# Patient Record
Sex: FEMALE | Race: OTHER | HISPANIC OR LATINO | URBAN - METROPOLITAN AREA
[De-identification: names, ages, dates, MRNs, and addresses within clinical notes are randomized per-mention and may not be internally consistent; named-entity substitution may affect disease eponyms.]

---

## 2018-03-26 ENCOUNTER — OFFICE (OUTPATIENT)
Dept: URBAN - METROPOLITAN AREA CLINIC 78 | Facility: CLINIC | Age: 43
End: 2018-03-26

## 2018-03-26 VITALS
WEIGHT: 224 LBS | TEMPERATURE: 97.3 F | HEART RATE: 84 BPM | DIASTOLIC BLOOD PRESSURE: 101 MMHG | HEIGHT: 56 IN | SYSTOLIC BLOOD PRESSURE: 133 MMHG

## 2018-03-26 DIAGNOSIS — K21.9 GASTRO-ESOPHAGEAL REFLUX DISEASE WITHOUT ESOPHAGITIS: ICD-10-CM

## 2018-03-26 DIAGNOSIS — E66.01 MORBID (SEVERE) OBESITY DUE TO EXCESS CALORIES: ICD-10-CM

## 2018-03-26 DIAGNOSIS — M54.5 LOW BACK PAIN: ICD-10-CM

## 2018-03-26 PROCEDURE — 99244 OFF/OP CNSLTJ NEW/EST MOD 40: CPT

## 2018-04-30 ENCOUNTER — ON CAMPUS - OUTPATIENT (OUTPATIENT)
Dept: URBAN - METROPOLITAN AREA HOSPITAL 63 | Facility: HOSPITAL | Age: 43
End: 2018-04-30

## 2018-04-30 DIAGNOSIS — K21.9 GASTRO-ESOPHAGEAL REFLUX DISEASE WITHOUT ESOPHAGITIS: ICD-10-CM

## 2018-04-30 DIAGNOSIS — R13.10 DYSPHAGIA, UNSPECIFIED: ICD-10-CM

## 2018-04-30 PROCEDURE — 43450 DILATE ESOPHAGUS 1/MULT PASS: CPT

## 2018-04-30 PROCEDURE — 43239 EGD BIOPSY SINGLE/MULTIPLE: CPT

## 2018-05-31 ENCOUNTER — OFFICE (OUTPATIENT)
Dept: URBAN - METROPOLITAN AREA CLINIC 78 | Facility: CLINIC | Age: 43
End: 2018-05-31

## 2018-05-31 VITALS
WEIGHT: 225 LBS | HEART RATE: 90 BPM | HEIGHT: 56 IN | SYSTOLIC BLOOD PRESSURE: 106 MMHG | TEMPERATURE: 98.5 F | DIASTOLIC BLOOD PRESSURE: 73 MMHG

## 2018-05-31 DIAGNOSIS — E66.01 MORBID (SEVERE) OBESITY DUE TO EXCESS CALORIES: ICD-10-CM

## 2018-05-31 DIAGNOSIS — K21.9 GASTRO-ESOPHAGEAL REFLUX DISEASE WITHOUT ESOPHAGITIS: ICD-10-CM

## 2018-05-31 DIAGNOSIS — M54.5 LOW BACK PAIN: ICD-10-CM

## 2018-05-31 PROCEDURE — 99214 OFFICE O/P EST MOD 30 MIN: CPT

## 2018-05-31 RX ORDER — ESOMEPRAZOLE MAGNESIUM 40 MG/1
CAPSULE, DELAYED RELEASE ORAL
Qty: 30 | Refills: 1 | Status: COMPLETED
End: 2022-07-07

## 2018-05-31 NOTE — SERVICEHPINOTES
MARKUS BANSAL   is a   43  female who presents for f/u after EGD and chronic GERD. She had EGD 03/26/18 showing chronic gastritis and negative h pylori. She has been taking Esomeprazole 40 mg as needed x 2 months, which she say has improved her reflux and decreased sensation of "food stuck" over mid to lower sternum. She notes reflux is triggered by spicy foods, tomatoes, and sodas that she is trying to eliminate. She expresses interest in losing weight and plans to do a "natural detox" on her own. She has frustration about inability to loss weight and encouraged her to seek weight loss center help to guide her on this journey. She has h/o cholecystectomy 6 years ago. Denies nausea, vomiting, epigastric pain, melena, change in bowel habits, weight loss. FONT style="BACKGROUND-COLOR: #ffffcc" visited="true"BR/FONT

## 2019-07-12 ENCOUNTER — OFFICE (OUTPATIENT)
Dept: URBAN - METROPOLITAN AREA CLINIC 78 | Facility: CLINIC | Age: 44
End: 2019-07-12

## 2019-07-12 VITALS
DIASTOLIC BLOOD PRESSURE: 78 MMHG | HEIGHT: 56 IN | TEMPERATURE: 97.1 F | WEIGHT: 223 LBS | SYSTOLIC BLOOD PRESSURE: 112 MMHG | HEART RATE: 62 BPM

## 2019-07-12 DIAGNOSIS — K21.9 GASTRO-ESOPHAGEAL REFLUX DISEASE WITHOUT ESOPHAGITIS: ICD-10-CM

## 2019-07-12 DIAGNOSIS — E66.01 MORBID (SEVERE) OBESITY DUE TO EXCESS CALORIES: ICD-10-CM

## 2019-07-12 PROCEDURE — 99214 OFFICE O/P EST MOD 30 MIN: CPT

## 2019-07-12 RX ORDER — OMEPRAZOLE 40 MG/1
CAPSULE, DELAYED RELEASE ORAL
Qty: 90 | Refills: 3 | Status: COMPLETED
Start: 2019-07-12 | End: 2022-07-07

## 2019-07-12 NOTE — SERVICEHPINOTES
MARKUS BANSAL   is a   44  female who is here for annual f/u GERD visit. She reports taking Omeprazole 40 mg qd since 03/2018 that provides well control as evident by rare breakthrough symptoms. She mentions stopping the Omeprazole 40 mg qd x 2 months ago due to concerns about potential side effects and noting return of reflux. Discussed risks/benefits of PPI use, advised trial of H2BR if she want's to avoid PPIs, and reviewed records from prior EGD with patient. Her EGD in 03/26/18 showed chronic gastritis and bx negative h pylori. She states rare breakthrough reflux is triggered by spicy foods, tomatoes, and sodas that she tries to avoid for the most part. She expresses interest in losing weight and plans to revisit weight loss center to possibly restart rx Phentermine. She has frustration about inability to loss weight and encouraged her to seek weight loss center help to guide her on this journey. She has h/o cholecystectomy 6 years ago. Denies n/v, dysphagia, epigastric pain, melena, change in bowel habits, weight loss. No other complaints. BR

## 2022-07-07 ENCOUNTER — OFFICE (OUTPATIENT)
Dept: URBAN - METROPOLITAN AREA CLINIC 79 | Facility: CLINIC | Age: 47
End: 2022-07-07
Payer: MEDICAID

## 2022-07-07 VITALS
TEMPERATURE: 97.1 F | HEART RATE: 84 BPM | DIASTOLIC BLOOD PRESSURE: 88 MMHG | WEIGHT: 230 LBS | SYSTOLIC BLOOD PRESSURE: 132 MMHG | HEIGHT: 56 IN

## 2022-07-07 DIAGNOSIS — E66.01 MORBID (SEVERE) OBESITY DUE TO EXCESS CALORIES: ICD-10-CM

## 2022-07-07 DIAGNOSIS — K21.9 GASTRO-ESOPHAGEAL REFLUX DISEASE WITHOUT ESOPHAGITIS: ICD-10-CM

## 2022-07-07 PROCEDURE — 99203 OFFICE O/P NEW LOW 30 MIN: CPT | Performed by: PHYSICIAN ASSISTANT

## 2022-07-07 NOTE — SERVICEHPINOTES
span style="background-color: rgb(255, 255, 0)" visited="true"Pt is a 47 yr old female here to discuss UGI issues. /lizet  She used to see us for GERD-had an EGD in 2018 which showed gastritis (no h pylori). Was on Nexium which alleviated all of her symptoms w/o SE but she stopped it. In June, she had pain in the mid/upper chest and had burning pain in her back. She took Omeprazole which helped but not fully. She went to the ER on 6/30/22 and the following tests were performed and negative: troponin, CMP, CBC, EKG. She tells me that the pain feels like her previous gastritis symptoms. She was given carafate and pepcid in the ER--one of these causes pain in the middle of her back.  
robi rosenbaum
No significant pain now. She is eating healthy and avoiding greasy foods. No nausea, vomiting, dysphagia, anorexia, early satiety, and no black stool. She still can feel heartburn/reflux. robi rosenbaum
No abuse of NSAIDS. No other GI related complaints today.

## 2022-08-12 ENCOUNTER — OFFICE (OUTPATIENT)
Dept: URBAN - METROPOLITAN AREA CLINIC 79 | Facility: CLINIC | Age: 47
End: 2022-08-12
Payer: MEDICAID

## 2022-08-12 VITALS
WEIGHT: 230 LBS | TEMPERATURE: 97.5 F | HEIGHT: 56 IN | DIASTOLIC BLOOD PRESSURE: 72 MMHG | HEART RATE: 92 BPM | SYSTOLIC BLOOD PRESSURE: 98 MMHG

## 2022-08-12 DIAGNOSIS — Z12.11 ENCOUNTER FOR SCREENING FOR MALIGNANT NEOPLASM OF COLON: ICD-10-CM

## 2022-08-12 DIAGNOSIS — E66.01 MORBID (SEVERE) OBESITY DUE TO EXCESS CALORIES: ICD-10-CM

## 2022-08-12 DIAGNOSIS — K21.9 GASTRO-ESOPHAGEAL REFLUX DISEASE WITHOUT ESOPHAGITIS: ICD-10-CM

## 2022-08-12 PROCEDURE — 99214 OFFICE O/P EST MOD 30 MIN: CPT | Performed by: PHYSICIAN ASSISTANT

## 2022-08-12 NOTE — SERVICEHPINOTES
Pt is here for f/u regarding upper GI symptoms .I saw her last month--hx as follows:   She used to see us for GERD-had an EGD in  which showed gastritis (no h pylori). Was on Nexium which alleviated all of her symptoms w/o SE but she stopped it. In , she had pain in the mid/upper chest and had burning pain in her back. She took Omeprazole which helped but not fully. She went to the ER on 22 and the following tests were performed and negative: troponin, CMP, CBC, EKG. She tells me that the pain feels like her previous gastritis symptoms. She was given carafate and pepcid in the ER--one of these causes pain in the middle of her back. By the time I saw her in July, she had no further abdominal pain. She also denied the following: nausea, vomiting, dysphagia, anorexia, early satiety, and no black stool. She still can feel heartburn/reflux. Given this, we decided to restart Nexium 40 mg given previous positive response. 
br
br
The Nexium has alleviated all symptoms. She is taking the Nexium twice per week with complete resolution of symptoms. 
br
br
No problems going to the bathroom. No lower abdominal pain. No blood in the stool. Her brother  from esophageal cancer. No family hx of CRC.